# Patient Record
Sex: FEMALE | Employment: FULL TIME | URBAN - METROPOLITAN AREA
[De-identification: names, ages, dates, MRNs, and addresses within clinical notes are randomized per-mention and may not be internally consistent; named-entity substitution may affect disease eponyms.]

---

## 2021-03-23 ENCOUNTER — APPOINTMENT (OUTPATIENT)
Dept: CT IMAGING | Facility: CLINIC | Age: 31
End: 2021-03-23
Attending: EMERGENCY MEDICINE
Payer: COMMERCIAL

## 2021-03-23 ENCOUNTER — HOSPITAL ENCOUNTER (EMERGENCY)
Facility: CLINIC | Age: 31
Discharge: HOME | End: 2021-03-24
Attending: EMERGENCY MEDICINE | Admitting: EMERGENCY MEDICINE
Payer: COMMERCIAL

## 2021-03-23 VITALS
DIASTOLIC BLOOD PRESSURE: 82 MMHG | SYSTOLIC BLOOD PRESSURE: 161 MMHG | TEMPERATURE: 98.6 F | HEART RATE: 82 BPM | RESPIRATION RATE: 18 BRPM | OXYGEN SATURATION: 98 %

## 2021-03-23 DIAGNOSIS — F10.10 ALCOHOL ABUSE: ICD-10-CM

## 2021-03-23 DIAGNOSIS — R41.3 MEMORY PROBLEM: ICD-10-CM

## 2021-03-23 DIAGNOSIS — S06.0X0A CONCUSSION WITHOUT LOSS OF CONSCIOUSNESS, INITIAL ENCOUNTER: ICD-10-CM

## 2021-03-23 LAB
ANION GAP SERPL CALCULATED.3IONS-SCNC: 5 MMOL/L (ref 3–14)
BASOPHILS # BLD AUTO: 0 10E9/L (ref 0–0.2)
BASOPHILS NFR BLD AUTO: 0.3 %
BUN SERPL-MCNC: 9 MG/DL (ref 7–30)
CALCIUM SERPL-MCNC: 9.4 MG/DL (ref 8.5–10.1)
CHLORIDE SERPL-SCNC: 104 MMOL/L (ref 94–109)
CO2 SERPL-SCNC: 28 MMOL/L (ref 20–32)
CREAT SERPL-MCNC: 0.67 MG/DL (ref 0.52–1.04)
DIFFERENTIAL METHOD BLD: ABNORMAL
EOSINOPHIL # BLD AUTO: 0.2 10E9/L (ref 0–0.7)
EOSINOPHIL NFR BLD AUTO: 1.6 %
ERYTHROCYTE [DISTWIDTH] IN BLOOD BY AUTOMATED COUNT: 15.1 % (ref 10–15)
GFR SERPL CREATININE-BSD FRML MDRD: >90 ML/MIN/{1.73_M2}
GLUCOSE BLDC GLUCOMTR-MCNC: 123 MG/DL (ref 70–99)
GLUCOSE SERPL-MCNC: 106 MG/DL (ref 70–99)
HCT VFR BLD AUTO: 40.5 % (ref 35–47)
HGB BLD-MCNC: 13.5 G/DL (ref 11.7–15.7)
IMM GRANULOCYTES # BLD: 0.1 10E9/L (ref 0–0.4)
IMM GRANULOCYTES NFR BLD: 0.5 %
LYMPHOCYTES # BLD AUTO: 1.9 10E9/L (ref 0.8–5.3)
LYMPHOCYTES NFR BLD AUTO: 19.5 %
MCH RBC QN AUTO: 27.9 PG (ref 26.5–33)
MCHC RBC AUTO-ENTMCNC: 33.3 G/DL (ref 31.5–36.5)
MCV RBC AUTO: 84 FL (ref 78–100)
MONOCYTES # BLD AUTO: 0.6 10E9/L (ref 0–1.3)
MONOCYTES NFR BLD AUTO: 6.2 %
NEUTROPHILS # BLD AUTO: 7.1 10E9/L (ref 1.6–8.3)
NEUTROPHILS NFR BLD AUTO: 71.9 %
NRBC # BLD AUTO: 0 10*3/UL
NRBC BLD AUTO-RTO: 0 /100
PLATELET # BLD AUTO: 237 10E9/L (ref 150–450)
POTASSIUM SERPL-SCNC: 3.4 MMOL/L (ref 3.4–5.3)
RBC # BLD AUTO: 4.84 10E12/L (ref 3.8–5.2)
SODIUM SERPL-SCNC: 137 MMOL/L (ref 133–144)
WBC # BLD AUTO: 9.9 10E9/L (ref 4–11)

## 2021-03-23 PROCEDURE — 85025 COMPLETE CBC W/AUTO DIFF WBC: CPT | Performed by: EMERGENCY MEDICINE

## 2021-03-23 PROCEDURE — 70450 CT HEAD/BRAIN W/O DYE: CPT

## 2021-03-23 PROCEDURE — 80048 BASIC METABOLIC PNL TOTAL CA: CPT | Performed by: EMERGENCY MEDICINE

## 2021-03-23 PROCEDURE — 99284 EMERGENCY DEPT VISIT MOD MDM: CPT | Mod: 25

## 2021-03-23 PROCEDURE — 999N001017 HC STATISTIC GLUCOSE BY METER IP

## 2021-03-23 ASSESSMENT — ENCOUNTER SYMPTOMS
FATIGUE: 1
DYSURIA: 0
FREQUENCY: 0
HEMATURIA: 0
CHILLS: 1
CONFUSION: 1
VOMITING: 0
ABDOMINAL PAIN: 0

## 2021-03-24 NOTE — ED TRIAGE NOTES
"Friday had a lot to drink. Since that time she has felt very disoriented.   \"In the moment I know what I Am doing but then after that I forget\" Unsure if she hit her head, but states she did fall.  Did a preworkout prior to drinking that day. Reports poor appetite since that time as well. Feeling anxious.   Oriented to time, date, self and friend who brought her in.   Denies heavy alcohol use or history of withdrawal from alcohol.   Denies headache, chest pain.   States this happened one other time that she drank heavily, but it resolved on its own.   "

## 2021-03-24 NOTE — ED PROVIDER NOTES
"History     Chief Complaint:  Disoriented       The history is provided by the patient.     Verónica Irving is a 30 year old female with a history of migraines who presents for evaluation of confusion. The patient had heavy alcohol consumption four days ago. She does not recall much of that night, though her friends thought she may have fallen and hit her head. She did have emesis that night, but has not emesis since. Since that night, she has been feeling \"groggy\". She has been able to go through her daily motions including dialing a phone and working, though she feels that at the end of the day she is unsure what happened throughout the day.     Here, she also reports increased fatigue and chills, though no further vomiting, loss of taste or smell, abdominal pain, vision changes, urinary symptoms, or other symptoms. She denies possibility of pregnancy. She has been eating well. Heavy alcohol use is not typical for her and she does not drink regularly.       Review of Systems   Constitutional: Positive for chills and fatigue.   Eyes: Negative for visual disturbance.   Gastrointestinal: Negative for abdominal pain and vomiting.   Genitourinary: Negative for dysuria, frequency, hematuria and urgency.   Psychiatric/Behavioral: Positive for confusion.   All other systems reviewed and are negative.        Allergies:  No Known Drug Allergies      Medications:   Fluticasone  Albuterol inhaler  Beclomethasone dipropionate inhaler     Medical History:   Migraine with aura  Fatty liver  Hyperlipidemia   Prediabetes     Surgical History:  ACL repair - left  Meniscus repair - left    Family History:   Father -  Diabetes   Mother -  GI cancer    Social History:  The patient was unaccompanied to the ED.  She is currently employed.   Alcohol Use: Yes      Physical Exam     Patient Vitals for the past 24 hrs:   BP Temp Temp src Pulse Resp SpO2   03/23/21 2145 (!) 161/82 98.6  F (37  C) Temporal 82 18 98 %          Physical " Exam    General: The patient is alert, in no respiratory distress.    HENT: Mucous membranes moist.    Cardiovascular: Regular rate and rhythm. Good pulses in all four extremities. Normal capillary refill and skin turgor.     Respiratory: Lungs are clear. No nasal flaring. No retractions. No wheezing, no crackles.    Gastrointestinal: Abdomen soft. No guarding, no rebound. No palpable hernias.     Musculoskeletal: No gross deformity.     Skin: No rashes or petechiae.     Neurologic: The patient is alert and oriented x3. GCS 15. No testable cranial nerve deficit. Follows commands with clear and appropriate speech. Gives appropriate answers. Good strength in all extremities. No gross neurologic deficit. Gross sensation intact. Pupils are round and reactive. No meningismus. Good three object recall and serial subtraction of threes.     Lymphatic: No cervical adenopathy. No lower extremity swelling.    Psychiatric: The patient is non-tearful.      Emergency Department Course     Imaging:    CT Head w/o Contrast:   IMPRESSION:   1.  Normal head CT.  Reading per radiology.     Laboratory:    CBC: WBC 9.9, HGB 13.5,   BMP: Glucose 106 (H), o/w WNL (Creatinine 0.67)   Glucose by Meter (Collected 2201): 123 (H)      Emergency Department Course:    Reviewed:  I reviewed the patient's nursing notes, vitals, past medical records, Care Everywhere.     Assessments:  2315 I performed an exam of the patient, as documented above.   0021 Patient rechecked and updated following imaging and laboratory results. We spoke about the plan for discharge and she is in agreement with this plan.     Disposition:  The patient was discharged to home.     Impression & Plan     Medical Decision Making:  Patient reported that she is confused however on further questioning she says she has difficulty with her memory and remembering things that she has done recently.  This all started after a night of heavy drinking with associated vomiting.   The patient reported that her friends had told her that she had fallen.  Due to the fact that we do not know the exact mechanism or what it happened I did order a head CT which was reassuring.  Labs are reassuring.  I think this is likely a concussion she does have a history of migraines and that could be part of this.  She is otherwise stable I stressed that she should rest what symptoms she should return for and she was discharged in good condition.      Diagnosis:     ICD-10-CM    1. Concussion without loss of consciousness, initial encounter  S06.0X0A    2. Memory problem  R41.3    3. Alcohol abuse  F10.10         Scribe Disclosure:  I, Eliz Berger, am serving as a scribe at 11:13 PM on 3/23/2021 to document services personally performed by Ramon Dunn MD based on my observations and the provider's statements to me.      Ramon Dunn MD  03/24/21 [id number]